# Patient Record
Sex: MALE | Race: WHITE | NOT HISPANIC OR LATINO | Employment: OTHER | URBAN - METROPOLITAN AREA
[De-identification: names, ages, dates, MRNs, and addresses within clinical notes are randomized per-mention and may not be internally consistent; named-entity substitution may affect disease eponyms.]

---

## 2024-12-30 NOTE — PROGRESS NOTES
Mercedes Ellis MD   Adult Reconstruction and Joint Replacement Surgery  Phone: [phone]     Fax: [phone]       Name: Dionisio Smith  Age: 79 y.o.   : 1945   Date of Visit: 2025        INITIAL CONSULTATION    CC: Right knee pain    Clinical History:  This patient presents with {Numbers; 1-10:23497} {weeks, months, years:86656} of {LEFT RIGHT BILATERAL:38314} knee pain.     Patient has tried the following {treatments tried:03999}. Date of last steroid injection: ***. Patient {DOES/DOES NOT:19523} have pain at night. Patient {DOES/DOES NOT:70142} report falls related to this problem. Patient is able to walk {Blocks: 56399}. Patient is currently using {assistive device:73723} as assistive device. Primarily complains of {pain location:99227} pain. Patient has difficulty with {activity limitations:64385}. The pain is significantly impacting their ability to perform activities of daily living. Patient reports no longer able to do activities such as ***.     Focused History  PMH: {FOCUSEDHX:10124}  PSH: {FOCUSEDSHX:83859}  Meds: {MEDSHX:52001}  Allergies: {MEDSHX:23669}  FH: ***No family history of any bleeding or clotting disorders.  SHx: {SOCHX:97445}  Dental Hx: {DENTAL:48296}  Jehovah´s Witness: no***    PROMs/HISTORY   ***    Review of Systems: Review of systems completed with medical assistant intake. Please refer to this note.     Physical Exam:  BMI: ***.    General: The patient is well appearing and has an appropriate affect.     Neurological Examination: ***SILT in SPN/DPN/Sural/Saphenous/Tibial nerves. ***5/5 EHL, FHL, Tibial anterior, Gastrocnemius. ***Coordination grossly intact.     Cardiovascular Exam: ***Capillary refill <2 seconds. ***2+ DP. ***No edema. ***No varicose veins.     Lymphatic Examination: There is no obvious lymphatic swelling*** present around the involved joint.    Skin Exam: Skin around the pertinent joint is without evidence of infection or ***rash.    Gait:  "The patient ambulates with an antalgic*** gait.     Lumbar spine:    No tenderness to palpation midline.    ***Negative straight leg raise bilaterally.    Right Hip Examination:  The skin is intact over the hip.    There is no tenderness over the greater trochanter.    Range of motion is full extension to 100 degrees of flexion.    The hip is stable without subluxation or dislocation.    The hip internally rotates to 15 degrees and externally rotates to 45 degrees.    There is no pain with hip motion.    Left Hip Examination:  The skin is intact over the hip.    There is no tenderness over the greater trochanter.    Range of motion is full extension to 100 degrees of flexion.    The hip is stable without subluxation or dislocation.    The hip internally rotates to 15 degrees and externally rotates to 45 degrees.    There is no pain with hip motion.    Right Knee Examination:  Examination of the knee reveals the skin to be intact.    There is {:97956::\"no\",\"a small\",\"a large\",\"a moderate\"} effusion in the knee.    The alignment of the knee is {:11739::\"Valgus\",\"neutral\",\"Varus\"}.    This deformity {:22802::\"is not\",\"is\"} correctable.    There is tenderness to palpation over the joint line.    There is significant quadriceps atrophy.    Range of Motion: {rom3:89080:: \"20\",\"15\",\"10\",\"5\",\"full extension\"} to {rom4:37657:: \"less than 90\",\"90\",\"100\",\"110\",\"120\"} degrees of flexion.    The knee is stable to varus-valgus stress and anterior-posterior stress.     There is {no/mild/moderate/severe:19664} grinding with range of motion.    There is {no/mild/moderate/severe:19664} patellofemoral crepitus.    Left Knee Examination:  Examination of the knee reveals the skin to be intact.     There is no obvious swelling.    There is a no effusion in the knee.     The alignment of the knee is normal.    There is no tenderness to palpation over the joint line.    There is no significant quadriceps atrophy.    Range of motion is full " extension to 120 degrees of flexion.    The knee is stable to varus-valgus stress and anterior-posterior stress.     There is {no/mild/moderate/severe:19664} grinding with range of motion.    There is {no/mild/moderate/severe:19664} patellofemoral crepitus.    Prior Labs:   ***    Radiographs:  Radiographs were personally reviewed today. There is evidence of {mild, moderate, severe: 55574} {LEFT RIGHT BILATERAL: 02556} knee osteoarthritis {with or without:86148} {medial, lateral, patellofemoral, tri:25471} bone on bone apposition.    Impression:  This patient presents with {AMB MILD/MODERATE/SEVERE:90885} {LEFT RIGHT BILATERAL:37538} knee osteoarthritis {with or without:92974} bone on bone apposition. ***Patient has tried and failed appropriate conservative measures and now has limitation in ADL's. ***The patient is not a candidate for surgery at this time.    Diagnosis:  ***    Recommendations / Plan:    I have discussed the options in detail with the patient. We have discussed anti-inflammatory medication, activity modification, physical therapy, corticosteroid injections, viscosupplementation injections, ***partial knee replacement surgery and total knee replacement surgery. ***The patient has not yet exhausted all conservative treatment measures.    The risks and benefits of all these treatment options have been discussed in detail. The patient has tried at least 3 months of the above conservative treatments and continues to have disabling pain, impaired activities of daily living and worsened quality of life.*** The patient is a candidate for a {LEFT RIGHT BILATERAL:51060} {PARTIAL TOTAL:70361} knee replacement. We discussed expected rehabilitation, DVT prophylaxis, time points during recovery, likely functional outcomes and long-term issues with knee replacement procedures.    We have reviewed the typical recovery after surgery and have discussed the fact that full recovery can take up to 1 year. Discussed  that numbness around the incision site is common after the surgery and can last up to a year or permanently.     ***For valgus knees, specifically discussed the possibility of peroneal nerve palsy resulting from correction of valgus deformity in the setting of total knee replacement, which can manifest as numbness over the dorsum of the foot or weakness in dorsiflexion or even foot drop. These changes can sometimes be permanent.      ***The patient has predominantly patellofemoral arthritis.  Reviewed the indications for patellofemoral arthroplasty versus total knee replacement as well as anticipated outcomes and longevity of each.  Discussed that patients with predominantly patellofemoral arthritis can have very good results with knee replacement surgery, though sometimes the results in this situation can be less satisfactory when compared to total knee replacement done for predominantly medial compartment arthritis.  The patient verbalizes understanding of this.    ***The patient has pre-existing hardware around the joint. Discussed that some or all of these implants may need to be removed to accommodate a joint replacement.    We have reviewed the typical recovery after surgery and have discussed the fact that full recovery can take up to 1 year or even longer.     The patient does not have any of the following contraindications to arthroplasty including active infection of the knee joint, systemic bacteremia, active skin infection or an open wound at the surgical site, neuropathic arthritis or severe, rapidly progressive neurological disease.     Patient will consider proceeding with {LEFT RIGHT BILATERAL:68337} {PARTIAL TOTAL:30872} knee replacement. All questions were answered today. If the patient chooses to move forward with surgical scheduling, they will be enrolled in a preoperative arthroplasty teaching class and the pre-admission testing pathway. The patient was encouraged to contact their primary care  physician*** to discuss fitness for surgery.     Social support after surgery: {Social:13115}  Pain medication plan: {Pain Plan:13191}  Additional preoperative considerations: {Preop:40182}    Diagnosis: {Surg DX:08810}   Procedure: {Surg Procedure:22357}  Surgery Location: {Surg Location:60505}   Equipment Requests: {Equipment Requests Primary:98384}  Anesthesia: {Anes:99883}  Discharge: {DISCHARGE:93574}    ***The risks and benefits of all these treatment options have been discussed in detail.     ***The patient has tried at least 3 months of the above conservative treatments and continues to have disabling pain, impaired activities of daily living and worsened quality of life.  Reviewed the surgical optimization steps to optimize their chances for a successful joint replacement surgery.      ***Currently their BMI is ***.  Discussed that obesity is a risk factor for continued progression of osteoarthritis. Each pound of weight loss offloads their hip and knee joints by 3-6 pounds.  The most effective of these options is weight loss mainly through restricting caloric intake. ***They would need to lose significant weight before being scheduled for surgery. A referral to a nutritionist was offered***. A referral to bariatric surgery was offered***.     ***A physical therapy prescription was ordered for the patient.  ***Patient will continue their home exercise program. ***Strategies for pain management using over-the-counter anti-inflammatory medications reviewed.  ***The patient was prescribed ***for pain management. ***The patient elects for a steroid injection, which was provided according to procedure note below. ***The patient was fit for a brace today. Encouraged them to maintain range of motion and strength around the knee joints.  They will continue to implement these strategies in addressing their pain.       ***Patient has extensively been involved in physical therapy in the past.  Patient defers a knee  brace today.  Steroid injections were discussed and patient will defer.    ***Patient was advised to seek further refills for prescription anti-inflammatory medications (e.g. Celebrex, Meloxicam) from their primary care physician as careful monitoring of kidney function, heart function, blood pressure, and other health considerations is important while on these medications.     ***Recommend the patient continue optimizing nonsurgical treatment interventions as outlined above for management of their knee arthritis.  ***I would be happy to see them again at any point to discuss surgery if they are more optimized or to review progress with nonsurgical treatment of arthritis. The patient verbalizes understanding with the recommendations and treatment plan as outlined above and is in agreement.  Questions were addressed.    Large Joint Injection 20610: Knee  Consent given by: Patient  Timeout: Immediately prior to procedure the correct patient, procedure, and site was verified. Sterile gloves and semi-sterile technique were used.   Indications: Knee pain and joint swelling.   Location: {LEFT RIGHT BILATERAL:17177} knee  Needle size: 22 G  Approach: Lateral    Medications administered: please see medical assistant note for lot number and expiration date.   Subcutaneous   4 ml of 1% lidocaine     Deep   4 ml of 1% lidocaine   4 mL 0.5% marcaine   2 mL of 40 mg kenalog     Aspirate amount: ***  Aspirate Appearance: ***   ***Analysis: Fluid sent for laboratory analysis, including cell count, differential, culture, crystal, AFB    Patient tolerance: Dressing applied. Patient tolerated the procedure well with no immediate complications.    _____________  Mercedes Ellis MD   Attending Orthopaedic Surgeon  [facility]    [facility]       This office note was transcribed with dictation software.  Please excuse any typographical errors, program misunderstandings leading to  inadvertent insertions or deletions of inappropriate wording, pronoun errors and other unintentional transcription errors not noticed on proof-reading.

## 2025-01-02 ENCOUNTER — HOSPITAL ENCOUNTER (OUTPATIENT)
Dept: RADIOLOGY | Facility: CLINIC | Age: 80
End: 2025-01-02
Payer: MEDICARE

## 2025-01-02 ENCOUNTER — APPOINTMENT (OUTPATIENT)
Dept: ORTHOPEDIC SURGERY | Facility: CLINIC | Age: 80
End: 2025-01-02
Payer: MEDICARE

## 2025-01-02 DIAGNOSIS — M17.12 PRIMARY OSTEOARTHRITIS OF LEFT KNEE: ICD-10-CM

## 2025-01-02 DIAGNOSIS — M17.11 PRIMARY OSTEOARTHRITIS OF RIGHT KNEE: Primary | ICD-10-CM

## 2025-01-31 ENCOUNTER — APPOINTMENT (OUTPATIENT)
Dept: ORTHOPEDIC SURGERY | Facility: CLINIC | Age: 80
End: 2025-01-31
Payer: MEDICARE

## 2025-01-31 ENCOUNTER — HOSPITAL ENCOUNTER (OUTPATIENT)
Dept: RADIOLOGY | Facility: CLINIC | Age: 80
Discharge: HOME | End: 2025-01-31
Payer: MEDICARE

## 2025-01-31 VITALS — WEIGHT: 209.4 LBS | HEIGHT: 70 IN | BODY MASS INDEX: 29.98 KG/M2

## 2025-01-31 DIAGNOSIS — M17.0 ARTHRITIS OF BOTH KNEES: Primary | ICD-10-CM

## 2025-01-31 DIAGNOSIS — M17.0 ARTHRITIS OF BOTH KNEES: ICD-10-CM

## 2025-01-31 PROCEDURE — 99204 OFFICE O/P NEW MOD 45 MIN: CPT | Performed by: STUDENT IN AN ORGANIZED HEALTH CARE EDUCATION/TRAINING PROGRAM

## 2025-01-31 PROCEDURE — 73564 X-RAY EXAM KNEE 4 OR MORE: CPT | Mod: 50

## 2025-01-31 RX ORDER — AMLODIPINE BESYLATE 10 MG/1
10 TABLET ORAL DAILY
COMMUNITY

## 2025-01-31 RX ORDER — ATORVASTATIN CALCIUM 40 MG/1
40 TABLET, FILM COATED ORAL
COMMUNITY
Start: 2024-03-07

## 2025-01-31 RX ORDER — AMLODIPINE BESYLATE 5 MG/1
5 TABLET ORAL DAILY
COMMUNITY
Start: 2017-04-10

## 2025-01-31 RX ORDER — ATORVASTATIN CALCIUM 10 MG/1
TABLET, FILM COATED ORAL
COMMUNITY
Start: 2017-01-27

## 2025-01-31 ASSESSMENT — PAIN - FUNCTIONAL ASSESSMENT: PAIN_FUNCTIONAL_ASSESSMENT: 0-10

## 2025-01-31 ASSESSMENT — PAIN SCALES - GENERAL: PAINLEVEL_OUTOF10: 5 - MODERATE PAIN

## 2025-01-31 ASSESSMENT — PAIN DESCRIPTION - DESCRIPTORS: DESCRIPTORS: SORE

## 2025-01-31 NOTE — PROGRESS NOTES
Mercedes Ellis MD   Adult Reconstruction and Joint Replacement Surgery  Phone: [phone]     Fax: [phone]       Name: Dionisio Smith  Age: 79 y.o.   : 1945   Date of Visit: 2025    INITIAL CONSULTATION    CC: bilateral knee pain    Clinical History:  This patient presents with several years of BILATERAL knee pain.     Patient has tried the following Activity modification, NSAIDs, Tylenol (arthritis dosing) , Physical therapy, Corticosteroid injections , and Xray. Date of last steroid injection: >6 months ago. Patient does not have pain at night. Patient does not report falls related to this problem. Patient is able to walk indoors only. Patient is currently using cane as assistive device. Primarily complains of anterior and medial pain. Patient has difficulty with climbing stairs, descending stairs, walking , getting up from a chair, prolonged sitting , and walking on unlevel surfaces . The pain is significantly impacting their ability to perform activities of daily living. Patient reports no longer able to do activities such as walking.     Focused History  PMH: Reviewed, PE/DVT: no, and Diabetes Mellitus  PSH: Reviewed , Hip/Knee replacement: no, Hip/Knee surgery: no, Anesthesia complications: no, Spine surgery: no, Surgical infection: no, and Weight loss surgery: no  Meds: Reviewed, Current Anticoagulants: no, Weight loss medication: no, and Current Opioids: no  Allergies: Reviewed  and The patient reports no contraindications or allergies to cephalosporins, aspirin, NSAIDs or opioids, except as noted above.  FH: No family history of any bleeding or clotting disorders.  SHx: Reviewed, Occupation: retired, Current smoker: no, EtOH intake weekly: rare, Social support: wife, and Preferred physical activities: Walking  Dental Hx: Last routine cleaning: > 6 months ago and All invasive dental work must be completed 3+ months prior to joint replacement surgery. Dental cleaning must be  completed 6+ weeks prior to joint replacement surgery. Patient are to avoid any invasive dental work 3-6 months post-surgically.   Jehovah´s Witness: no    PROMs/HISTORY   PROMs   No questionnaires on file.     History reviewed. No pertinent past medical history.    History reviewed. No pertinent past medical history.  Documented in chart and reviewed.     History reviewed. No pertinent surgical history.    Allergies: He is allergic to erythromycin.     Medications:  Current Outpatient Medications   Medication Instructions    amLODIPine (NORVASC) 10 mg, oral, Daily    amLODIPine (NORVASC) 5 mg, Daily    atorvastatin (Lipitor) 10 mg tablet Take by mouth.    atorvastatin (LIPITOR) 40 mg, Daily RT       No family history on file.  Documented in chart and reviewed.     Social History     Tobacco Use    Smoking status: Not on file    Smokeless tobacco: Not on file   Substance Use Topics    Alcohol use: Not on file        Review of Systems: Review of systems completed with medical assistant intake. Please refer to this note.     Physical Exam:  BMI: 30.    General: The patient is well appearing and has an appropriate affect.     Neurological Examination: SILT in SPN/DPN/Sural/Saphenous/Tibial nerves. 5/5 EHL, FHL, Tibial anterior, Gastrocnemius. Coordination grossly intact.     Cardiovascular Exam: Capillary refill <2 seconds.     Lymphatic Examination: There is no obvious lymphatic swelling present around the involved joint.    Skin Exam: Skin around the pertinent joint is without evidence of infection or rash.    Gait: The patient ambulates with an antalgic gait.     Lumbar spine:    No tenderness to palpation midline.    Negative straight leg raise bilaterally.    Right Hip Examination:  The skin is intact over the hip.    There is no tenderness over the greater trochanter.    Range of motion is full extension to 100 degrees of flexion.    The hip is stable without subluxation or dislocation.    The hip internally  "rotates to 15 degrees and externally rotates to 45 degrees.    There is no pain with hip motion.    Left Hip Examination:  The skin is intact over the hip.    There is no tenderness over the greater trochanter.    Range of motion is full extension to 100 degrees of flexion.    The hip is stable without subluxation or dislocation.    The hip internally rotates to 15 degrees and externally rotates to 45 degrees.    There is no pain with hip motion.    Right Knee Examination:  Examination of the knee reveals the skin to be intact.    There is a moderate effusion in the knee.    The alignment of the knee is Varus.    This deformity is not correctable.    There is tenderness to palpation over the joint line.    There is significant quadriceps atrophy.    Range of Motion: 10 to 110 degrees of flexion.    The knee is stable to varus-valgus stress and anterior-posterior stress.     There is moderate grinding with range of motion.    There is mild patellofemoral crepitus.    Left Knee Examination:  Examination of the knee reveals the skin to be intact.    There is a moderate effusion in the knee.    The alignment of the knee is Varus.    This deformity is not correctable.    There is tenderness to palpation over the joint line.    There is significant quadriceps atrophy.    Range of Motion: 10 to 110 degrees of flexion.    The knee is stable to varus-valgus stress and anterior-posterior stress.     There is moderate grinding with range of motion.    There is mild patellofemoral crepitus.    Prior Labs:   Prior Labs:   No results found for: \"WBC\", \"HGB\", \"HCT\", \"MCV\", \"PLT\"   No results found for: \"INR\", \"PROTIME\"      No results found for: \"GLUCOSE\", \"CALCIUM\", \"NA\", \"K\", \"CO2\", \"CL\", \"BUN\", \"CREATININE\"   No results found for: \"CKTOTAL\", \"CKMB\", \"CKMBINDEX\", \"TROPONINI\"   Lab Results   Component Value Date    HGBA1C 6.9 (H) 09/06/2024         No results found for: \"CRP\"   No results found for: \"SEDRATE\"  "     Radiographs:  Radiographs were personally reviewed today. There is evidence of severe BILATERAL knee osteoarthritis with MEDIAL bone on bone apposition.    Impression:  This patient presents with severe BILATERAL knee osteoarthritis with bone on bone apposition.     Diagnosis:  Arthritis of both knees     Recommendations / Plan:    I have discussed the options in detail with the patient. We have discussed anti-inflammatory medication, activity modification, physical therapy, corticosteroid injections, viscosupplementation injections, partial knee replacement surgery and total knee replacement surgery. The patient has not yet exhausted all conservative treatment measures.    The risks and benefits of all these treatment options have been discussed in detail.     The patient has tried at least 3 months of the above conservative treatments and continues to have disabling pain, impaired activities of daily living and worsened quality of life.  Reviewed the surgical optimization steps to optimize their chances for a successful joint replacement surgery.      Currently their BMI is 30.  Discussed that obesity is a risk factor for continued progression of osteoarthritis. Each pound of weight loss offloads their hip and knee joints by 3-6 pounds.  The most effective of these options is weight loss mainly through restricting caloric intake.     A physical therapy prescription was ordered for the patient.  Patient will continue their home exercise program. Strategies for pain management using over-the-counter anti-inflammatory medications reviewed.  Specifically discussed Ibuprofen up to 800 mg every 8 hours, with close monitoring for potential side effects from this medication.  Discussed utility of steroid injections and patient will consider this with future.  The patient was fit for a brace today. Econ brace. Encouraged them to maintain range of motion and strength around the knee joints.  They will continue to  implement these strategies in addressing their pain.       Recommend the patient continue optimizing nonsurgical treatment interventions as outlined above for management of their knee arthritis.  I would be happy to see them again at any point to discuss surgery if they are more optimized or to review progress with nonsurgical treatment of arthritis. The patient verbalizes understanding with the recommendations and treatment plan as outlined above and is in agreement.  Questions were addressed.    _____________  Mercedes Ellis MD   Attending Orthopaedic Surgeon  [facility]    [facility]       This office note was transcribed with dictation software.  Please excuse any typographical errors, program misunderstandings leading to inadvertent insertions or deletions of inappropriate wording, pronoun errors and other unintentional transcription errors not noticed on proof-reading.

## 2025-02-24 ENCOUNTER — EVALUATION (OUTPATIENT)
Dept: PHYSICAL THERAPY | Facility: CLINIC | Age: 80
End: 2025-02-24
Payer: MEDICARE

## 2025-02-24 DIAGNOSIS — M17.0 ARTHRITIS OF BOTH KNEES: ICD-10-CM

## 2025-02-24 PROCEDURE — 97161 PT EVAL LOW COMPLEX 20 MIN: CPT | Mod: GP

## 2025-02-24 NOTE — PROGRESS NOTES
"  Physical Therapy    Physical Therapy Evaluation and Treatment    Patient Name: Dionisio Smith  MRN: [id number]  Today's Date: 2/24/2025  Time Calculation  Start Time: 0945  Stop Time: 1030  Time Calculation (min): 45 min    Insurance:  Visit number: 1  Authorization info: [place] AUTH, [place]  Insurance Type: Payor: MEDICARE / Plan: MEDICARE PART A AND B / Product Type: *No Product type* /     Current Problem  1. Arthritis of both knees  Referral to Physical Therapy    Follow Up In Physical Therapy          General  79 yoM presenting to therapy for bilateral knee pain associated with OA  \"Bill\"     Precautions  LE neuropathy    SUBJECTIVE:   79 yoM presenting to therapy for bilateral knee pain associated with OA  Left knee worse than Right knee   Pain is located on the inside of his knees  Sore and stiff; not so much a pain  Ibuprofen PRN; helpful  Topical cream; somewhat helpful  Prior injections, not helpful  Aggravating factors: walking long distances, stairs, sit/stand transfers  Amb with cane at time  Denies falls, but states he's wobbly  Retired; wife still works   Bed/bath first floor  Stairs to basement, but uses chair lift  Going [facility] in May; plans to rent a scooter because he will be unable to walk it  [facility]  Pt trying to lose weight to help reduce his knee pain and improve his pain tolerance    Imaging:   Bilateral Knee Xray 01/2025  IMPRESSION:  Tricompartmental bilateral knee osteoarthrosis, severe medially.  Moderate bilateral suprapatellar joint effusions.     Pain:   Current: 0/10 no pain, mostly stiffness and soreness    Patient/Family Goal: less pain    Outcome Measures: 23/80    OBJECTIVE:    Observation: bilateral genu varus L > R    Transfers: Definite need of UE for supine to sit and sit to stand    Bilateral Hip AROM:  Flex: WFL B  ER at 90: WFL B  IR at 90: 50% impaired B  Ext: unable to achieve neutral B    Bilateral Knee AROM:  Ext: Lacking 8-10 deg B  Flex: 115 deg " "B  Patellofemoral Joint Mobility: hypomobile all planes B    Gait: antalgic without device, WBOS, increased movement in frontal plane \"waddling gait\", decreased single leg stance    Stairs: able to negotiate 4\" step with 2 rails     4 Stage Balance Test:  - NBOS: 10 sec  - Modified Tandem: 10 sec R/L  - Tandem: 5 sec R/L  - Single Le sec R/L    OP Education:   Okay to ride recumbent bike   Ice and elevation to manage swelling   Heat for stiffness (if not swelling)     HEP:  Access Code: [email or web address]  URL: [email or web address]  Date: 2025  Prepared by: Clarice Ruffing    Exercises  - Beginner Bridge  - 1-2 x daily - 7 x weekly - 1-3 sets - 10 reps  - Supine Quad Set  - 1-2 x daily - 7 x weekly - 1-3 sets - 10 reps - 3 hold  - Supine Ankle Pumps  - 1-2 x daily - 7 x weekly - 1-3 sets - 10 reps - 5-10 hold  - Supine Heel Slide with Strap  - 1-2 x daily - 7 x weekly - 1-3 sets - 10 reps - 5-10 hold  - Supine Piriformis Stretch with Leg Straight  - 1-2 x daily - 7 x weekly - 1-3 sets - 10 reps - 5-10 hold  - Heel Toe Raises with Counter Support  - 1-2 x daily - 7 x weekly - 1-3 sets - 10 reps    Response to treatment: Good    ASSESSMENT:   79 yoM presenting to therapy for bilateral knee pain associated with OA limiting his ability to walk, stand, perform transfers and negotiate stairs. Pt is a great candidate for PT. Treatment should focus on manual therapy to improve joint mobility and soft tissue mobility, exercises to hip improve LE flexibility and strength, and functional mobility exercises. Pt considering getting an injection so plans to contact ortho sometime soon.      PLAN:  OP PT PLAN:  Treatment/Interventions: Cryotherapy , Dry Needling  , Education/Instruction , Electrical Stimulation , Manual Therapy  , Neuromuscular re-education , Self care/home management , Taping techniques , Therapeutic activities , Therapeutic exercise  , and Ultrasound   PT Plan: Skilled PT   PT Frequency: 1-2 times per " week  Duration:10  Certification Period Start Date: 2/24/25  Certification Period End Date: 5/25/25  Visits Approved: [place]  Rehab Potential: Good  Plan of Care Agreement: Patient         Goals:   Bilateral knee AROM will improve to < 5 deg ext to >120 deg flex to improve joint mobility and reduce joint stiffness and soreness  2.   Pt will increase walking tolerance from 5 min to > 10 minutes to improve functional independence  3.   Pt will report an improvement in stiffness and soreness by 50% to allow for improved pain tolerance to all ADL's and functional mobility  4.   Pt will be independent with HEP to allow for exercise progression and eventual discharge to [facility]

## 2025-03-05 PROBLEM — M17.0 OSTEOARTHRITIS OF KNEES, BILATERAL: Status: ACTIVE | Noted: 2025-03-05

## 2025-03-05 NOTE — PROGRESS NOTES
"  Physical Therapy    Physical Therapy Evaluation and Treatment    Patient Name: Dionisio Smith  MRN: [id number]  Today's Date: 3/6/2025  Time Calculation  Start Time: 0915  Stop Time: 1000  Time Calculation (min): 45 min    Insurance:  Visit number: 2  Authorization info: [place] AUTH, [place]  Insurance Type: Payor: MEDICARE / Plan: MEDICARE PART A AND B / Product Type: *No Product type* /     Current Problem  1. Osteoarthritis of knees, bilateral        2. Arthritis of both knees  Follow Up In Physical Therapy            General  79 yoM presenting to therapy for bilateral knee pain associated with OA  \"Bill\"     Precautions  LE neuropathy    SUBJECTIVE:   Patient reports that he gets stiffness and can't walk too long.    Imaging:   Bilateral Knee Xray 2025  IMPRESSION:  Tricompartmental bilateral knee osteoarthrosis, severe medially.  Moderate bilateral suprapatellar joint effusions.     Pain:   Current: 0/10 no pain, mostly stiffness and soreness    Patient/Family Goal: less pain    Outcome Measures:     OBJECTIVE:    Observation: bilateral genu varus L > R    Transfers: Definite need of UE for supine to sit and sit to stand    Bilateral Hip AROM:  Flex: WFL B  ER at 90: WFL B  IR at 90: 50% impaired B  Ext: unable to achieve neutral B    Bilateral Knee AROM:  Ext: Lacking 8-10 deg B  Flex: 115 deg B  Patellofemoral Joint Mobility: hypomobile all planes B    Gait: antalgic without device, WBOS, increased movement in frontal plane \"waddling gait\", decreased single leg stance    Stairs: able to negotiate 4\" step with 2 rails     4 Stage Balance Test:  - NBOS: 10 sec  - Modified Tandem: 10 sec R/L  - Tandem: 5 sec R/L  - Single Le sec R/L    OP Education:   Okay to ride recumbent bike   Ice and elevation to manage swelling   Heat for stiffness (if not swelling)     HEP:  Access Code: [email or web address]  URL: [email or web address]  Date: 2025  Prepared by: Clarice Jose    [email or web address] Yolanda's Access code " "(3/6/25)  Ther EX:    Rec Bike x 6 min [place]/L2  Heelslides x 20 R/L  QS 5\" x 20   Hip add x 20   Hip abd x 20  LAQ 2#  2 x 10         Response to treatment: Good    ASSESSMENT:   Patient tolerated NWB exercises well with  no increased knee pain.  Will benefit from continued ROM, flexibility and strength training to maintain functional mobility with decreased pain.      PLAN:  OP PT PLAN:  Treatment/Interventions: Cryotherapy , Dry Needling  , Education/Instruction , Electrical Stimulation , Manual Therapy  , Neuromuscular re-education , Self care/home management , Taping techniques , Therapeutic activities , Therapeutic exercise  , and Ultrasound   PT Plan: Skilled PT   PT Frequency: 1-2 times per week  Duration:10  Certification Period Start Date: 2/24/25  Certification Period End Date: 5/25/25  Visits Approved: [place]  Rehab Potential: Good  Plan of Care Agreement: Patient         Goals:   Bilateral knee AROM will improve to < 5 deg ext to >120 deg flex to improve joint mobility and reduce joint stiffness and soreness  2.   Pt will increase walking tolerance from 5 min to > 10 minutes to improve functional independence  3.   Pt will report an improvement in stiffness and soreness by 50% to allow for improved pain tolerance to all ADL's and functional mobility  4.   Pt will be independent with [facility] to allow for exercise progression and eventual discharge to [facility]    "

## 2025-03-06 ENCOUNTER — TREATMENT (OUTPATIENT)
Dept: PHYSICAL THERAPY | Facility: CLINIC | Age: 80
End: 2025-03-06
Payer: MEDICARE

## 2025-03-06 DIAGNOSIS — M17.0 OSTEOARTHRITIS OF KNEES, BILATERAL: Primary | ICD-10-CM

## 2025-03-06 DIAGNOSIS — M17.0 ARTHRITIS OF BOTH KNEES: ICD-10-CM

## 2025-03-06 PROCEDURE — 97110 THERAPEUTIC EXERCISES: CPT | Mod: GP,CQ

## 2025-03-12 ENCOUNTER — TREATMENT (OUTPATIENT)
Dept: PHYSICAL THERAPY | Facility: CLINIC | Age: 80
End: 2025-03-12
Payer: MEDICARE

## 2025-03-12 DIAGNOSIS — M17.0 ARTHRITIS OF BOTH KNEES: ICD-10-CM

## 2025-03-12 PROCEDURE — 97110 THERAPEUTIC EXERCISES: CPT | Mod: GP

## 2025-03-12 PROCEDURE — 97140 MANUAL THERAPY 1/> REGIONS: CPT | Mod: GP

## 2025-03-12 NOTE — PROGRESS NOTES
"  Physical Therapy    Physical Therapy Evaluation and Treatment    Patient Name: Dionisio Smith  MRN: [id number]  Today's Date: 3/12/2025  Time Calculation  Start Time: 1115  Stop Time: 1200  Time Calculation (min): 45 min    Insurance:  Visit number: 3  Authorization info: [place] AUTH, [place]  Insurance Type: Payor: MEDICARE / Plan: MEDICARE PART A AND B / Product Type: *No Product type* /     Current Problem  1. Arthritis of both knees  Follow Up In Physical Therapy            General  79 yoM presenting to therapy for bilateral knee pain associated with OA  \"Bill\"     Precautions  LE neuropathy    SUBJECTIVE:   Felt okay after last session. Pain and stiffness near the inside of his knees.    Imaging:   Bilateral Knee Xray 2025  IMPRESSION:  Tricompartmental bilateral knee osteoarthrosis, severe medially.  Moderate bilateral suprapatellar joint effusions.     Pain:   Current: 0/10 no pain, mostly stiffness and soreness    Patient/Family Goal: less pain    Outcome Measures:     OBJECTIVE:    Observation: bilateral genu varus L > R    Transfers: Definite need of UE for supine to sit and sit to stand    Bilateral Hip AROM:  Flex: WFL B  ER at 90: WFL B  IR at 90: 50% impaired B  Ext: unable to achieve neutral B    Bilateral Knee AROM:  Ext: Lacking 8-10 deg B  Flex: 115 deg B  Patellofemoral Joint Mobility: hypomobile all planes B    Gait: antalgic without device, WBOS, increased movement in frontal plane \"waddling gait\", decreased single leg stance    Stairs: able to negotiate 4\" step with 2 rails     4 Stage Balance Test:  - NBOS: 10 sec  - Modified Tandem: 10 sec R/L  - Tandem: 5 sec R/L  - Single Le sec R/L    OP Education:   Okay to ride recumbent bike   Ice and elevation to manage swelling   Heat for stiffness (if not swelling)     HEP:  Access Code: [email or web address]  URL: [email or web address]  Date: 2025  Prepared by: Clarice Jose    [email or web address] Yolanda's Access code (3/6/25)    Ther EX: 20  Rec Bike x 5 " "min Level 1  Heelslides with strap x 15 R/L  QS with pillow support 3\" x 20   SAQ x 10 R/L      Manual 25  PROM ankle DF (for gastroc/soleus stretching)  Patellar mobilizations, all planes  STM medial knees  PROM hip flex + IR    Response to treatment: Felt better    ASSESSMENT:   Reproduction of medial knee pain with most exercises. Difficulty isolating quadriceps contractions secondary to limited knee ext. Good response to manual therapy with reduction in symptoms afterwards  Will benefit from continued ROM, flexibility and strength training to maintain functional mobility with decreased pain.      PLAN:  OP PT PLAN:  Treatment/Interventions: Cryotherapy , Dry Needling  , Education/Instruction , Electrical Stimulation , Manual Therapy  , Neuromuscular re-education , Self care/home management , Taping techniques , Therapeutic activities , Therapeutic exercise  , and Ultrasound   PT Plan: Skilled PT   PT Frequency: 1-2 times per week  Duration:10  Certification Period Start Date: 2/24/25  Certification Period End Date: 5/25/25  Visits Approved: [place]  Rehab Potential: Good  Plan of Care Agreement: Patient         Goals:   Bilateral knee AROM will improve to < 5 deg ext to >120 deg flex to improve joint mobility and reduce joint stiffness and soreness  2.   Pt will increase walking tolerance from 5 min to > 10 minutes to improve functional independence  3.   Pt will report an improvement in stiffness and soreness by 50% to allow for improved pain tolerance to all ADL's and functional mobility  4.   Pt will be independent with HEP to allow for exercise progression and eventual discharge to [facility]    "

## 2025-03-28 ENCOUNTER — APPOINTMENT (OUTPATIENT)
Dept: ORTHOPEDIC SURGERY | Facility: CLINIC | Age: 80
End: 2025-03-28
Payer: MEDICARE

## 2025-03-28 DIAGNOSIS — M17.0 ARTHRITIS OF BOTH KNEES: Primary | ICD-10-CM

## 2025-03-28 RX ORDER — LIDOCAINE HYDROCHLORIDE 10 MG/ML
8 INJECTION, SOLUTION INFILTRATION; PERINEURAL
Status: COMPLETED | OUTPATIENT
Start: 2025-03-28 | End: 2025-03-28

## 2025-03-28 RX ORDER — TRIAMCINOLONE ACETONIDE 40 MG/ML
40 INJECTION, SUSPENSION INTRA-ARTICULAR; INTRAMUSCULAR
Status: COMPLETED | OUTPATIENT
Start: 2025-03-28 | End: 2025-03-28

## 2025-03-28 RX ORDER — BUPIVACAINE HYDROCHLORIDE 5 MG/ML
4 INJECTION, SOLUTION PERINEURAL
Status: COMPLETED | OUTPATIENT
Start: 2025-03-28 | End: 2025-03-28

## 2025-03-28 RX ADMIN — LIDOCAINE HYDROCHLORIDE 8 ML: 10 INJECTION, SOLUTION INFILTRATION; PERINEURAL at 13:50

## 2025-03-28 RX ADMIN — TRIAMCINOLONE ACETONIDE 40 MG: 40 INJECTION, SUSPENSION INTRA-ARTICULAR; INTRAMUSCULAR at 13:50

## 2025-03-28 RX ADMIN — BUPIVACAINE HYDROCHLORIDE 4 ML: 5 INJECTION, SOLUTION PERINEURAL at 13:50

## 2025-03-28 NOTE — PROGRESS NOTES
Mercedes Ellis MD   Adult Reconstruction and Joint Replacement Surgery  Phone: [phone]     Fax: [phone]       Name: Dionisio Smith  Age: 79 y.o.   : 1945   Date of Visit: 3/28/2025    Follow-up Knee    CC: Follow-up BILATERAL knee     History of Present Illness:  This patient presents with several years of BILATERAL knee pain.     They were last seen for this problem on 2025. At the last visit, the patient was given a prescription for physical therapy.  He was encouraged to take ibuprofen for pain management.  Patient decided to defer steroid injections at the last visit.  He was fit for an Econ brace. The patient reports he would like to try injection to the left knee today.    Patient has tried the following Activity modification, NSAIDs, Tylenol (arthritis dosing) , Physical therapy, Corticosteroid injections , and Xray. Date of last steroid injection: >6 months ago. Patient does not have pain at night. Patient does not report falls related to this problem. Patient is able to walk indoors only. Patient is currently using cane as assistive device. Primarily complains of anterior and medial pain. Patient has difficulty with climbing stairs, descending stairs, walking , getting up from a chair, prolonged sitting , and walking on unlevel surfaces . The pain is significantly impacting their ability to perform activities of daily living. Patient reports no longer able to do activities such as walking.      Focused History  PMH: Reviewed, PE/DVT: no, and Diabetes Mellitus  PSH: Reviewed , Hip/Knee replacement: no, Hip/Knee surgery: no, Anesthesia complications: no, Spine surgery: no, Surgical infection: no, and Weight loss surgery: no  Meds: Reviewed, Current Anticoagulants: no, Weight loss medication: no, and Current Opioids: no  Allergies: Reviewed  and The patient reports no contraindications or allergies to cephalosporins, aspirin, NSAIDs or opioids, except as noted above.  FH: No  family history of any bleeding or clotting disorders.  SHx: Reviewed, Occupation: retired, Current smoker: no, EtOH intake weekly: rare, Social support: wife, and Preferred physical activities: Walking  Dental Hx: Last routine cleaning: > 6 months ago and All invasive dental work must be completed 3+ months prior to joint replacement surgery. Dental cleaning must be completed 6+ weeks prior to joint replacement surgery. Patient are to avoid any invasive dental work 3-6 months post-surgically.   Jehovah´s Witness: no     HISTORY  PROMs   No questionnaires on file.     No past medical history on file.    No past medical history on file.  Documented in chart and reviewed.     No past surgical history on file.    Allergies: He is allergic to erythromycin.     Medications:  Current Outpatient Medications   Medication Instructions    amLODIPine (NORVASC) 10 mg, oral, Daily    amLODIPine (NORVASC) 5 mg, Daily    atorvastatin (Lipitor) 10 mg tablet Take by mouth.    atorvastatin (LIPITOR) 40 mg, Daily RT       No family history on file.  Documented in chart and reviewed.     Social History     Tobacco Use    Smoking status: Not on file    Smokeless tobacco: Not on file   Substance Use Topics    Alcohol use: Not on file        Review of Systems: Review of systems completed with medical assistant intake. Please refer to this note.     Physical Exam:  BMI: 30.    General: The patient is well appearing and has an appropriate affect.      Neurological Examination: SILT in SPN/DPN/Sural/Saphenous/Tibial nerves. 5/5 EHL, FHL, Tibial anterior, Gastrocnemius. Coordination grossly intact.      Cardiovascular Exam: Capillary refill <2 seconds.      Lymphatic Examination: There is no obvious lymphatic swelling present around the involved joint.     Skin Exam: Skin around the pertinent joint is without evidence of infection or rash.     Gait: The patient ambulates with an antalgic gait.      Lumbar spine:     No tenderness to palpation  midline.     Negative straight leg raise bilaterally.     Right Hip Examination:  The skin is intact over the hip.     There is no tenderness over the greater trochanter.     Range of motion is full extension to 100 degrees of flexion.     The hip is stable without subluxation or dislocation.     The hip internally rotates to 15 degrees and externally rotates to 45 degrees.     There is no pain with hip motion.     Left Hip Examination:  The skin is intact over the hip.     There is no tenderness over the greater trochanter.     Range of motion is full extension to 100 degrees of flexion.     The hip is stable without subluxation or dislocation.     The hip internally rotates to 15 degrees and externally rotates to 45 degrees.     There is no pain with hip motion.     Right Knee Examination:  Examination of the knee reveals the skin to be intact.     There is a moderate effusion in the knee.     The alignment of the knee is Varus.     This deformity is not correctable.     There is tenderness to palpation over the joint line.     There is significant quadriceps atrophy.     Range of Motion: 10 to 110 degrees of flexion.     The knee is stable to varus-valgus stress and anterior-posterior stress.      There is moderate grinding with range of motion.     There is mild patellofemoral crepitus.     Left Knee Examination:  Examination of the knee reveals the skin to be intact.     There is a moderate effusion in the knee.     The alignment of the knee is Varus.     This deformity is not correctable.     There is tenderness to palpation over the joint line.     There is significant quadriceps atrophy.     Range of Motion: 10 to 110 degrees of flexion.     The knee is stable to varus-valgus stress and anterior-posterior stress.      There is moderate grinding with range of motion.     There is mild patellofemoral crepitus.    Imaging:  Radiographs were personally reviewed today. There is evidence of severe BILATERAL knee  osteoarthritis with MEDIAL bone on bone apposition.    Impression and Plan:  This patient is here for follow-up evaluation of BILATERAL knee.    DIAGNOSIS  Arthritis of both knees     I have discussed the options in detail with the patient. We have discussed anti-inflammatory medication, activity modification, physical therapy, corticosteroid injections, viscosupplementation injections, partial knee replacement surgery and total knee replacement surgery. Patient is responding well to nonsurgical treatment measures.    The risks and benefits of all these treatment options have been discussed in detail.     The patient has tried at least 3 months of the above conservative treatments and continues to have disabling pain, impaired activities of daily living and worsened quality of life.  Reviewed the surgical optimization steps to optimize their chances for a successful joint replacement surgery.      Currently their BMI is 30.  Discussed that obesity is a risk factor for continued progression of osteoarthritis. Each pound of weight loss offloads their hip and knee joints by 3-6 pounds.  The most effective of these options is weight loss mainly through restricting caloric intake.     A physical therapy prescription was ordered for the patient previously.  Patient will continue their home exercise program. Strategies for pain management using over-the-counter anti-inflammatory medications reviewed.  The patient elects for a steroid injection, which was provided according to procedure note below. Discussed utility of brace.  Patient has previously been fit for Eicon brace. Encouraged them to maintain range of motion and strength around the knee joints.  They will continue to implement these strategies in addressing their pain.      A1c has risen. Will only plan to inject LEFT knee today given poorly managed diabetes.     Recommend the patient continue optimizing nonsurgical treatment interventions as outlined above for  management of their knee arthritis.  I would be happy to see them again at any point to discuss surgery if indicated or they are more optimized or to review progress with nonsurgical treatment of arthritis. The patient verbalizes understanding with the recommendations and treatment plan as outlined above and is in agreement.  Questions were addressed.    RTC: as needed    X-rays at next visit: as needed    Large Joint Injection 20610: Knee  Consent given by: Patient  Timeout: Immediately prior to procedure the correct patient, procedure, and site was verified. Sterile gloves and semi-sterile technique were used.   Indications: Knee pain and joint swelling.   Location: LEFT knee  Needle size: 22 G  Approach: Lateral    Medications administered: Please refer to medical assistant note for lot number and expiration date.   Subcutaneous   4 ml of 1% lidocaine     Deep   4 ml of 1% lidocaine   4 mL 0.5% marcaine   1 mL of 40 mg kenalog     Patient tolerance: Dressing applied. Patient tolerated the procedure well with no immediate complications.    L Inj/Asp: L knee on 3/28/2025 1:50 PM  Indications: pain and joint swelling  Details: 22 G needle, lateral approach  Medications: 40 mg triamcinolone acetonide 40 mg/mL; 8 mL lidocaine 10 mg/mL (1 %); 4 mL BUPivacaine HCl 0.5 % (5 mg/mL)  Outcome: tolerated well, no immediate complications  Procedure, treatment alternatives, risks and benefits explained, specific risks discussed. Consent was given by the patient. Immediately prior to procedure a time out was called to verify the correct patient, procedure, equipment, support staff and site/side marked as required. Patient was prepped and draped in the usual sterile fashion.       _____________________  Mercedes Ellis MD   Attending Orthopaedic Surgeon  [facility]    [facility]    This office note was transcribed with dictation software.  Please excuse any typographical  errors, program misunderstandings leading to inadvertent insertions or deletions of inappropriate wording, pronoun errors and other unintentional transcription errors not noticed on proof-reading.

## 2025-04-01 ENCOUNTER — APPOINTMENT (OUTPATIENT)
Dept: PHYSICAL THERAPY | Facility: CLINIC | Age: 80
End: 2025-04-01
Payer: MEDICARE

## 2025-04-06 NOTE — PROGRESS NOTES
"  Physical Therapy    Physical Therapy Evaluation and Treatment    Patient Name: Dionisio Smith  MRN: [id number]  Today's Date: 2025  Time Calculation  Start Time: 1000  Stop Time: 1045  Time Calculation (min): 45 min    Insurance:  Visit number: 4  Authorization info: [place] AUTH, [place]  Insurance Type: Payor: MEDICARE / Plan: MEDICARE PART A AND B / Product Type: *No Product type* /     Current Problem  1. Osteoarthritis of knees, bilateral        2. Arthritis of both knees  Follow Up In Physical Therapy            General  79 yoM presenting to therapy for bilateral knee pain associated with OA  \"Bill\"     Precautions  LE neuropathy    SUBJECTIVE  Patient reports that he had an injection in L knee and will have one in R in May    Imaging:   Bilateral Knee Xray 2025  IMPRESSION:  Tricompartmental bilateral knee osteoarthrosis, severe medially.  Moderate bilateral suprapatellar joint effusions.     Pain:   Current: 0/10 no pain, mostly stiffness and soreness    Patient/Family Goal: less pain    Outcome Measures:     OBJECTIVE:    Observation: bilateral genu varus L > R    Transfers: Definite need of UE for supine to sit and sit to stand    Bilateral Hip AROM:  Flex: WFL B  ER at 90: WFL B  IR at 90: 50% impaired B  Ext: unable to achieve neutral B    Bilateral Knee AROM:  Ext: Lacking 8-10 deg B  Flex: 115 deg B  Patellofemoral Joint Mobility: hypomobile all planes B    Gait: antalgic without device, WBOS, increased movement in frontal plane \"waddling gait\", decreased single leg stance    Stairs: able to negotiate 4\" step with 2 rails     4 Stage Balance Test:  - NBOS: 10 sec  - Modified Tandem: 10 sec R/L  - Tandem: 5 sec R/L  - Single Le sec R/L    OP Education:   Okay to ride recumbent bike   Ice and elevation to manage swelling   Heat for stiffness (if not swelling)     HEP:  Access Code: [email or web address]  URL: [email or web address]  Date: 2025  Prepared by: Clarice Jose    [email or web address] Thomas " "Access code (3/6/25)    Ther EX: 15  Rec Bike x 5 min Level 1  Heelslides with strap x 15 R/L  QS with pillow support 3\" x 20   SAQ x 10 R/L      Manual 25  PROM ankle DF (for gastroc/soleus stretching)  Patellar mobilizations, all planes  STM medial knees  Gentle seated distraction    Response to treatment: Felt better    ASSESSMENT:    Good response to manual therapy with reduction in symptoms afterwards  Will benefit from continued ROM, flexibility and strength training to maintain functional mobility with decreased pain.      PLAN:  OP PT PLAN:  Treatment/Interventions: Cryotherapy , Dry Needling  , Education/Instruction , Electrical Stimulation , Manual Therapy  , Neuromuscular re-education , Self care/home management , Taping techniques , Therapeutic activities , Therapeutic exercise  , and Ultrasound   PT Plan: Skilled PT   PT Frequency: 1-2 times per week  Duration:10  Certification Period Start Date: 2/24/25  Certification Period End Date: 5/25/25  Visits Approved: [place]  Rehab Potential: Good  Plan of Care Agreement: Patient         Goals:   Bilateral knee AROM will improve to < 5 deg ext to >120 deg flex to improve joint mobility and reduce joint stiffness and soreness  2.   Pt will increase walking tolerance from 5 min to > 10 minutes to improve functional independence  3.   Pt will report an improvement in stiffness and soreness by 50% to allow for improved pain tolerance to all ADL's and functional mobility  4.   Pt will be independent with [facility] to allow for exercise progression and eventual discharge to [facility]    "

## 2025-04-07 ENCOUNTER — TREATMENT (OUTPATIENT)
Dept: PHYSICAL THERAPY | Facility: CLINIC | Age: 80
End: 2025-04-07
Payer: MEDICARE

## 2025-04-07 DIAGNOSIS — M17.0 OSTEOARTHRITIS OF KNEES, BILATERAL: Primary | ICD-10-CM

## 2025-04-07 DIAGNOSIS — M17.0 ARTHRITIS OF BOTH KNEES: ICD-10-CM

## 2025-04-07 PROCEDURE — 97110 THERAPEUTIC EXERCISES: CPT | Mod: GP,CQ

## 2025-04-07 PROCEDURE — 97140 MANUAL THERAPY 1/> REGIONS: CPT | Mod: GP,CQ

## 2025-04-29 NOTE — PROGRESS NOTES
"  Physical Therapy    Physical Therapy Evaluation and Treatment    Patient Name: Dionisio Smith  MRN: [id number]  Today's Date: 2025  Time Calculation  Start Time: 1000  Stop Time: 1045  Time Calculation (min): 45 min    Insurance:  Visit number: 5  Authorization info: [place] AUTH, [place]  Insurance Type: Payor: MEDICARE / Plan: MEDICARE PART A AND B / Product Type: *No Product type* /     Current Problem  1. Osteoarthritis of knees, bilateral        2. Arthritis of both knees  Follow Up In Physical Therapy            General  79 yoM presenting to therapy for bilateral knee pain associated with OA  \"Bill\"     Precautions  LE neuropathy    SUBJECTIVE  Patient reports that he is getting injection in R knee Friday.  I know that its not going to go completely away but the PT makes it feel a little better. Going to [place] next week.    Imaging:   Bilateral Knee Xray 2025  IMPRESSION:  Tricompartmental bilateral knee osteoarthrosis, severe medially.  Moderate bilateral suprapatellar joint effusions.     Pain:   Current: 0/10 no pain, mostly stiffness and soreness    Patient/Family Goal: less pain    Outcome Measures:     OBJECTIVE:    Observation: bilateral genu varus L > R    Transfers: Definite need of UE for supine to sit and sit to stand    Bilateral Hip AROM:  Flex: WFL B  ER at 90: WFL B  IR at 90: 50% impaired B  Ext: unable to achieve neutral B    Bilateral Knee AROM:  Ext: Lacking 8-10 deg B  Flex: 115 deg B  Patellofemoral Joint Mobility: hypomobile all planes B    Gait: antalgic without device, WBOS, increased movement in frontal plane \"waddling gait\", decreased single leg stance    Stairs: able to negotiate 4\" step with 2 rails     4 Stage Balance Test:  - NBOS: 10 sec  - Modified Tandem: 10 sec R/L  - Tandem: 5 sec R/L  - Single Le sec R/L    OP Education:   Okay to ride recumbent bike   Ice and elevation to manage swelling   Heat for stiffness (if not swelling)     HEP:  Access Code: [email or web address]  URL: " "[email or web address]  Date: 02/24/2025  Prepared by: Clarice Jose    [email or web address] Yolanda's Access code (3/6/25)    Ther EX: 15  Rec Bike x 5 min Level 1  Heelslides with strap x 15 R/L  QS with pillow support 3\" x 20   SAQ x 10 R/L      Manual 25  Patellar mobilizations, all planes  STM medial knees  PROM B knees    Response to treatment: Felt better    ASSESSMENT:   Good response to manual therapy with reduction in symptoms afterwards He has decreased discomfort with massage but will benefit from a more active program after return from [place].  Will benefit from continued ROM, flexibility and strength training to maintain functional mobility with decreased pain.      PLAN:  OP PT PLAN:  Treatment/Interventions: Cryotherapy , Dry Needling  , Education/Instruction , Electrical Stimulation , Manual Therapy  , Neuromuscular re-education , Self care/home management , Taping techniques , Therapeutic activities , Therapeutic exercise  , and Ultrasound   PT Plan: Skilled PT   PT Frequency: 1-2 times per week  Duration:10  Certification Period Start Date: 2/24/25  Certification Period End Date: 5/25/25  Visits Approved: [place]  Rehab Potential: Good  Plan of Care Agreement: Patient         Goals:   Bilateral knee AROM will improve to < 5 deg ext to >120 deg flex to improve joint mobility and reduce joint stiffness and soreness  2.   Pt will increase walking tolerance from 5 min to > 10 minutes to improve functional independence  3.   Pt will report an improvement in stiffness and soreness by 50% to allow for improved pain tolerance to all ADL's and functional mobility  4.   Pt will be independent with HEP to allow for exercise progression and eventual discharge to [facility]    "

## 2025-04-30 ENCOUNTER — TREATMENT (OUTPATIENT)
Dept: PHYSICAL THERAPY | Facility: CLINIC | Age: 80
End: 2025-04-30
Payer: MEDICARE

## 2025-04-30 DIAGNOSIS — M17.0 OSTEOARTHRITIS OF KNEES, BILATERAL: Primary | ICD-10-CM

## 2025-04-30 DIAGNOSIS — M17.0 ARTHRITIS OF BOTH KNEES: ICD-10-CM

## 2025-04-30 PROCEDURE — 97110 THERAPEUTIC EXERCISES: CPT | Mod: GP,CQ

## 2025-04-30 PROCEDURE — 97140 MANUAL THERAPY 1/> REGIONS: CPT | Mod: GP,CQ

## 2025-05-02 ENCOUNTER — APPOINTMENT (OUTPATIENT)
Dept: ORTHOPEDIC SURGERY | Facility: CLINIC | Age: 80
End: 2025-05-02
Payer: MEDICARE

## 2025-05-02 DIAGNOSIS — M17.11 PRIMARY OSTEOARTHRITIS OF RIGHT KNEE: Primary | ICD-10-CM

## 2025-05-02 PROCEDURE — 99214 OFFICE O/P EST MOD 30 MIN: CPT | Performed by: STUDENT IN AN ORGANIZED HEALTH CARE EDUCATION/TRAINING PROGRAM

## 2025-05-02 PROCEDURE — 20610 DRAIN/INJ JOINT/BURSA W/O US: CPT | Performed by: STUDENT IN AN ORGANIZED HEALTH CARE EDUCATION/TRAINING PROGRAM

## 2025-05-02 RX ORDER — BUPIVACAINE HYDROCHLORIDE 5 MG/ML
4 INJECTION, SOLUTION PERINEURAL
Status: COMPLETED | OUTPATIENT
Start: 2025-05-02 | End: 2025-05-02

## 2025-05-02 RX ORDER — LIDOCAINE HYDROCHLORIDE 10 MG/ML
8 INJECTION, SOLUTION INFILTRATION; PERINEURAL
Status: COMPLETED | OUTPATIENT
Start: 2025-05-02 | End: 2025-05-02

## 2025-05-02 RX ORDER — TRIAMCINOLONE ACETONIDE 40 MG/ML
40 INJECTION, SUSPENSION INTRA-ARTICULAR; INTRAMUSCULAR
Status: COMPLETED | OUTPATIENT
Start: 2025-05-02 | End: 2025-05-02

## 2025-05-02 RX ADMIN — TRIAMCINOLONE ACETONIDE 40 MG: 40 INJECTION, SUSPENSION INTRA-ARTICULAR; INTRAMUSCULAR at 11:01

## 2025-05-02 RX ADMIN — BUPIVACAINE HYDROCHLORIDE 4 ML: 5 INJECTION, SOLUTION PERINEURAL at 11:01

## 2025-05-02 RX ADMIN — LIDOCAINE HYDROCHLORIDE 8 ML: 10 INJECTION, SOLUTION INFILTRATION; PERINEURAL at 11:01

## 2025-05-02 NOTE — PROGRESS NOTES
Mercedes Ellis MD   Adult Reconstruction and Joint Replacement Surgery  Phone: [phone]     Fax: [phone]       Name: Dionisio Smith  Age: 79 y.o.   : 1945   Date of Visit: 2025    Follow-up Knee    CC: Follow-up RIGHT knee     History of Present Illness:  This patient presents with several years of BILATERAL knee pain.     They were last seen for this problem on 3/28/2025. At the last visit, the patient received a steroid injection to the LEFT knee.  He did not receive an injection to the RIGHT knee at that time given elevated A1c.  The patient reports 30% relief for the last 3 weeks after LEFT knee injection.  He returns today hoping for an RIGHT knee injection.    Patient has tried the following Activity modification, NSAIDs, Tylenol (arthritis dosing) , Physical therapy, Corticosteroid injections , and Xray. Date of last steroid injection: >6 months ago RIGHT knee, 3/28/2025 LEFT knee. Patient does not have pain at night. Patient does not report falls related to this problem. Patient is able to walk indoors only. Patient is currently using cane as assistive device. Primarily complains of anterior and medial pain. Patient has difficulty with climbing stairs, descending stairs, walking , getting up from a chair, prolonged sitting , and walking on unlevel surfaces . The pain is significantly impacting their ability to perform activities of daily living. Patient reports no longer able to do activities such as walking.      Focused History  PMH: Reviewed, PE/DVT: no, and Diabetes Mellitus  PSH: Reviewed , Hip/Knee replacement: no, Hip/Knee surgery: no, Anesthesia complications: no, Spine surgery: no, Surgical infection: no, and Weight loss surgery: no  Meds: Reviewed, Current Anticoagulants: no, Weight loss medication: no, and Current Opioids: no  Allergies: Reviewed  and The patient reports no contraindications or allergies to cephalosporins, aspirin, NSAIDs or opioids, except as noted  above.  FH: No family history of any bleeding or clotting disorders.  SHx: Reviewed, Occupation: retired, Current smoker: no, EtOH intake weekly: rare, Social support: wife, and Preferred physical activities: Walking  Dental Hx: Last routine cleaning: > 6 months ago and All invasive dental work must be completed 3+ months prior to joint replacement surgery. Dental cleaning must be completed 6+ weeks prior to joint replacement surgery. Patient are to avoid any invasive dental work 3-6 months post-surgically.   Jehovah´s Witness: no    HISTORY  PROMs   No questionnaires on file.     Medical History[1]    Medical History[2]  Documented in chart and reviewed.     Surgical History[3]    Allergies: He is allergic to erythromycin.     Medications:  Current Outpatient Medications   Medication Instructions    amLODIPine (NORVASC) 10 mg, oral, Daily    amLODIPine (NORVASC) 5 mg, Daily    atorvastatin (Lipitor) 10 mg tablet Take by mouth.    atorvastatin (LIPITOR) 40 mg, Daily RT       Family History[4]  Documented in chart and reviewed.     Social History     Tobacco Use    Smoking status: Not on file    Smokeless tobacco: Not on file   Substance Use Topics    Alcohol use: Not on file        Review of Systems: Review of systems completed with medical assistant intake. Please refer to this note.     Physical Exam:  BMI: 30.    General: The patient is well appearing and has an appropriate affect.      Neurological Examination: SILT in SPN/DPN/Sural/Saphenous/Tibial nerves. 5/5 EHL, FHL, Tibial anterior, Gastrocnemius. Coordination grossly intact.      Cardiovascular Exam: Capillary refill <2 seconds.      Lymphatic Examination: There is no obvious lymphatic swelling present around the involved joint.     Skin Exam: Skin around the pertinent joint is without evidence of infection or rash.     Gait: The patient ambulates with an antalgic gait.      Lumbar spine:     No tenderness to palpation midline.     Negative straight leg  raise bilaterally.     Right Hip Examination:  The skin is intact over the hip.     There is no tenderness over the greater trochanter.     Range of motion is full extension to 100 degrees of flexion.     The hip is stable without subluxation or dislocation.     The hip internally rotates to 15 degrees and externally rotates to 45 degrees.     There is no pain with hip motion.     Left Hip Examination:  The skin is intact over the hip.     There is no tenderness over the greater trochanter.     Range of motion is full extension to 100 degrees of flexion.     The hip is stable without subluxation or dislocation.     The hip internally rotates to 15 degrees and externally rotates to 45 degrees.     There is no pain with hip motion.     Right Knee Examination:  Examination of the knee reveals the skin to be intact.     There is a moderate effusion in the knee.     The alignment of the knee is Varus.     This deformity is not correctable.     There is tenderness to palpation over the joint line.     There is significant quadriceps atrophy.     Range of Motion: 10 to 110 degrees of flexion.     The knee is stable to varus-valgus stress and anterior-posterior stress.      There is moderate grinding with range of motion.     There is mild patellofemoral crepitus.     Left Knee Examination:  Examination of the knee reveals the skin to be intact.     There is a moderate effusion in the knee.     The alignment of the knee is Varus.     This deformity is not correctable.     There is tenderness to palpation over the joint line.     There is significant quadriceps atrophy.     Range of Motion: 10 to 110 degrees of flexion.     The knee is stable to varus-valgus stress and anterior-posterior stress.      There is moderate grinding with range of motion.     There is mild patellofemoral crepitus.    Imaging:    Radiographs were personally reviewed today. There is evidence of severe BILATERAL knee osteoarthritis with MEDIAL bone on  bone apposition.    Impression and Plan:  This patient is here for follow-up evaluation of BILATERAL knee.    DIAGNOSIS  Primary osteoarthritis of right knee     I have discussed the options in detail with the patient. We have discussed anti-inflammatory medication, activity modification, physical therapy, corticosteroid injections, viscosupplementation injections, partial knee replacement surgery and total knee replacement surgery. Patient is responding well to nonsurgical treatment measures.    The risks and benefits of all these treatment options have been discussed in detail.     The patient has tried at least 3 months of the above conservative treatments and continues to have disabling pain, impaired activities of daily living and worsened quality of life.  Reviewed the surgical optimization steps to optimize their chances for a successful joint replacement surgery.      Currently their BMI is 30.  Discussed that obesity is a risk factor for continued progression of osteoarthritis. Each pound of weight loss offloads their hip and knee joints by 3-6 pounds.  The most effective of these options is weight loss mainly through restricting caloric intake.     Encouraged continued physical therapy.  Patient will continue their home exercise program. Strategies for pain management using over-the-counter anti-inflammatory medications reviewed.  The patient elects for a steroid injection, which was provided according to procedure note below. Discussed utility of brace. Will defer brace at this time.  Already has brace for LEFT knee. Encouraged them to maintain range of motion and strength around the knee joints.  They will continue to implement these strategies in addressing their pain.      Recommend the patient continue optimizing nonsurgical treatment interventions as outlined above for management of their knee arthritis.  I would be happy to see them again at any point to discuss surgery if indicated or they are more  optimized or to review progress with nonsurgical treatment of arthritis. The patient verbalizes understanding with the recommendations and treatment plan as outlined above and is in agreement.  Questions were addressed.    RTC: as needed    X-rays at next visit: as needed    Large Joint Injection 01479: Knee  Consent given by: Patient  Timeout: Immediately prior to procedure the correct patient, procedure, and site was verified. Sterile gloves and semi-sterile technique were used.   Indications: Knee pain and joint swelling.   Location: RIGHT knee  Needle size: 22 G  Approach: Lateral    Medications administered: Please refer to medical assistant note for lot number and expiration date.   Subcutaneous   4 ml of 1% lidocaine     Deep   4 ml of 1% lidocaine   4 mL 0.5% marcaine   1 mL of 40 mg kenalog     Patient tolerance: Dressing applied. Patient tolerated the procedure well with no immediate complications.    L Inj/Asp: R knee on 5/2/2025 11:01 AM  Indications: pain and joint swelling  Details: 22 G needle, lateral approach  Medications: 40 mg triamcinolone acetonide 40 mg/mL; 8 mL lidocaine 10 mg/mL (1 %); 4 mL BUPivacaine HCl 0.5 % (5 mg/mL)  Outcome: tolerated well, no immediate complications  Procedure, treatment alternatives, risks and benefits explained, specific risks discussed. Consent was given by the patient. Immediately prior to procedure a time out was called to verify the correct patient, procedure, equipment, support staff and site/side marked as required. Patient was prepped and draped in the usual sterile fashion.             _____________________  Mercedes Ellis MD   Attending Orthopaedic Surgeon  [facility]    [facility]    This office note was transcribed with dictation software.  Please excuse any typographical errors, program misunderstandings leading to inadvertent insertions or deletions of inappropriate wording, pronoun errors and  other unintentional transcription errors not noticed on proof-reading.             [1] No past medical history on file.  [2] No past medical history on file.  [3] No past surgical history on file.  [4] No family history on file.

## 2025-05-23 ENCOUNTER — TREATMENT (OUTPATIENT)
Dept: PHYSICAL THERAPY | Facility: CLINIC | Age: 80
End: 2025-05-23
Payer: MEDICARE

## 2025-05-23 DIAGNOSIS — M17.0 ARTHRITIS OF BOTH KNEES: ICD-10-CM

## 2025-05-23 PROCEDURE — 97110 THERAPEUTIC EXERCISES: CPT | Mod: GP

## 2025-05-23 PROCEDURE — 97140 MANUAL THERAPY 1/> REGIONS: CPT | Mod: GP

## 2025-05-23 NOTE — PROGRESS NOTES
"  Physical Therapy    Physical Therapy Evaluation and Treatment    Patient Name: Dionisio Smith  MRN: [id number]  Today's Date: 5/23/2025  Time Calculation  Start Time: 1400  Stop Time: 1445  Time Calculation (min): 45 min    Insurance:  Visit number: 6  Authorization info: NO AUTH, [place]  Insurance Type: Payor: MEDICARE / Plan: MEDICARE PART A AND B / Product Type: *No Product type* /     Current Problem  1. Arthritis of both knees  Follow Up In Physical Therapy            General  79 yoM presenting to therapy for bilateral knee pain associated with OA  \"Bill\"     Precautions  LE neuropathy    SUBJECTIVE  Received a gel injection in his Right knee on 5/2/25. It's been helpful. Right knee feels better than Left knee in general. Recently went to [place] with his family. Used a scooter while he was there knowing he wouldn't be able to walk the luc[place]. Currently able to walk about 5 min, sometimes a little more or less. Primary goal is to improve his ability to walk. Partially compliant with HEP.    Imaging:   Bilateral Knee Xray 01/2025  IMPRESSION:  Tricompartmental bilateral knee osteoarthrosis, severe medially.  Moderate bilateral suprapatellar joint effusions.     Pain:   Current: 0/10 no pain, mostly stiffness and soreness    Patient/Family Goal: less pain    Outcome Measures: 23/80    OBJECTIVE:    Updated 5/23/35  Observation: bilateral genu varus L > R    Transfers: Sit to stand x 1 rep without UE but voices difficulty and stiffness    Bilateral Hip AROM:  Flex: WFL B  ER at 90: WFL B  IR at 90: 50% impaired B  Ext: unable to achieve neutral B    Bilateral Knee AROM:  Ext: Lacking 8-10 deg B  Flex: 120 deg R, 115 deg L  Patellofemoral Joint Mobility: hypomobile all planes B    Gait: antalgic without device, WBOS, increased movement in frontal plane \"waddling gait\", decreased single leg stance    Stairs: able to negotiate 4\" step with 2 rails     4 Stage Balance Test:  - NBOS: 10 sec  - Modified Tandem: 10 sec R/L  - " "Tandem: 10 sec R, 5 sec L  - Single Le sec R/L    OP Education:   Okay to ride recumbent bike   Ice and elevation to manage swelling   Heat for stiffness (if not swelling)     HEP:  Access Code: [email or web address]  URL: [email or web address]  Date: 2025  Prepared by: Clarice Jose    [email or web address] Yolanda's Access code (3/6/25)    Ther EX: 35  Rec Bike x 5 min Level 1  Recheck  Step ups, 4\" x 5 R/L   Calf stretch slantboard 10\" x 5   Mini squats -attempted, painful  TKE x 10 R/L   Sidestep x 10  SAQ x 10 R/L      Manual 10  Patellar mobilizations, all planes (not today)  STM medial knees  PROM B knees (not today)    Response to treatment: Felt better    ASSESSMENT:   Recheck performed today. Some improvement in Right knee ROM, strength and balance. Minimal improvements in Left knee. Pt continues to experience persistent pain, stiffness and soreness in L > R knees associated with OA. He is still responding to injections so TKR has not been discussed yet. Plan is to continue with therapy for now. Encouraged increased compliance with HEP to maximize outcomes.       PLAN:  OP PT PLAN:  Treatment/Interventions: Cryotherapy , Dry Needling  , Education/Instruction , Electrical Stimulation , Manual Therapy  , Neuromuscular re-education , Self care/home management , Taping techniques , Therapeutic activities , Therapeutic exercise  , and Ultrasound   PT Plan: Skilled PT   PT Frequency: 1-2 times per week  Duration:10  Certification Period Start Date: 25  Certification Period End Date: 25  Visits Approved: [place]  Rehab Potential: Good  Plan of Care Agreement: Patient         Goals:   Bilateral knee AROM will improve to < 5 deg ext to >120 deg flex to improve joint mobility and reduce joint stiffness and soreness; PROGRESSING  2.   Pt will increase walking tolerance from 5 min to > 10 minutes to improve functional independence; PROGRESSING  3.   Pt will report an improvement in stiffness and soreness by 50% to allow " for improved pain tolerance to all ADL's and functional mobility; PROGRESSING  4.   Pt will be independent with HEP to allow for exercise progression and eventual discharge to HEP; PROGRESSING

## 2025-06-09 ENCOUNTER — TREATMENT (OUTPATIENT)
Dept: PHYSICAL THERAPY | Facility: CLINIC | Age: 80
End: 2025-06-09
Payer: MEDICARE

## 2025-06-09 DIAGNOSIS — M17.0 ARTHRITIS OF BOTH KNEES: ICD-10-CM

## 2025-06-09 PROCEDURE — 97140 MANUAL THERAPY 1/> REGIONS: CPT | Mod: GP

## 2025-06-09 PROCEDURE — 97116 GAIT TRAINING THERAPY: CPT | Mod: GP

## 2025-06-09 NOTE — PROGRESS NOTES
"  Physical Therapy    Physical Therapy Evaluation and Treatment    Patient Name: Dionisio Smith  MRN: [id number]  Today's Date: 2025  Time Calculation  Start Time: 1125  Stop Time: 1205  Time Calculation (min): 40 min    Insurance:  Visit number: 7  Authorization info: [place] AUTH, [place]  Insurance Type: Payor: MEDICARE / Plan: MEDICARE PART A AND B / Product Type: *No Product type* /     Current Problem  1. Arthritis of both knees  Follow Up In Physical Therapy            General  79 yoM presenting to therapy for bilateral knee pain associated with OA  \"Bill\"     Precautions  LE neuropathy    SUBJECTIVE  He fell last week while walking the dog. The dog pulled him and his Left knee locked up causing him to fall. His knee was swollen, but the swelling is improving. He is having trouble going up/down the stairs now. Needs to bring both feet to one step.    Imaging:   Bilateral Knee Xray 2025  IMPRESSION:  Tricompartmental bilateral knee osteoarthrosis, severe medially.  Moderate bilateral suprapatellar joint effusions.     Pain:   Current: 0/10 no pain, mostly stiffness and soreness    Patient/Family Goal: less pain    Outcome Measures:     OBJECTIVE:    Updated 35  Observation: bilateral genu varus L > R    Transfers: Sit to stand x 1 rep without UE but voices difficulty and stiffness    Bilateral Hip AROM:  Flex: WFL B  ER at 90: WFL B  IR at 90: 50% impaired B  Ext: unable to achieve neutral B    Bilateral Knee AROM:  Ext: Lacking 8-10 deg B  Flex: 120 deg R, 115 deg L  Patellofemoral Joint Mobility: hypomobile all planes B    Gait: antalgic without device, WBOS, increased movement in frontal plane \"waddling gait\", decreased single leg stance    Stairs: able to negotiate 4\" step with 2 rails     4 Stage Balance Test:  - NBOS: 10 sec  - Modified Tandem: 10 sec R/L  - Tandem: 10 sec R, 5 sec L  - Single Le sec R/L    OP Education:   Okay to ride recumbent bike   Ice and elevation to manage swelling   Heat " "for stiffness (if not swelling)     HEP:  Access Code: [email or web address]  URL: [email or web address]  Date: 02/24/2025  Prepared by: Clarice Jose    [email or web address] Yolanda's Access code (3/6/25)    Ther EX: NOT TODAY  Rec Bike x 5 min Level 1  Step ups, 4\" x 5 R/L   Calf stretch slantboard 10\" x 5   Mini squats -attempted, painful  TKE x 10 R/L   Sidestep x 10  SAQ x 10 R/L      Manual 35  Patellar mobilizations, all planes   STM knees and quads  Massage gun to knees and quads  PROM B knees (not today)    Gait Training 5  Education on stair negotiation with step to gait pattern and rail assist    Response to treatment: Felt better    ASSESSMENT:   Pt experienced an accidental fall while walking his dog last week. His symptoms are improving, but did we review how to safely negotiate stairs with step to gait pattern. Continued with manual therapy to reduce pain and improve mobility.     PLAN:  OP PT PLAN:  Treatment/Interventions: Cryotherapy , Dry Needling  , Education/Instruction , Electrical Stimulation , Manual Therapy  , Neuromuscular re-education , Self care/home management , Taping techniques , Therapeutic activities , Therapeutic exercise  , and Ultrasound   PT Plan: Skilled PT   PT Frequency: 1-2 times per week  Duration:10  Certification Period Start Date: 5/23/25  Certification Period End Date: 8/21/25  Visits Approved: [place]  Rehab Potential: Good  Plan of Care Agreement: Patient         Goals:   Bilateral knee AROM will improve to < 5 deg ext to >120 deg flex to improve joint mobility and reduce joint stiffness and soreness; PROGRESSING  2.   Pt will increase walking tolerance from 5 min to > 10 minutes to improve functional independence; PROGRESSING  3.   Pt will report an improvement in stiffness and soreness by 50% to allow for improved pain tolerance to all ADL's and functional mobility; PROGRESSING  4.   Pt will be independent with HEP to allow for exercise progression and eventual discharge to [facility]; " PROGRESSING

## 2025-06-16 ENCOUNTER — TREATMENT (OUTPATIENT)
Dept: PHYSICAL THERAPY | Facility: CLINIC | Age: 80
End: 2025-06-16
Payer: MEDICARE

## 2025-06-16 DIAGNOSIS — M17.0 ARTHRITIS OF BOTH KNEES: ICD-10-CM

## 2025-06-16 PROCEDURE — 97110 THERAPEUTIC EXERCISES: CPT | Mod: GP

## 2025-06-16 PROCEDURE — 97140 MANUAL THERAPY 1/> REGIONS: CPT | Mod: GP

## 2025-06-16 NOTE — PROGRESS NOTES
"  Physical Therapy    Physical Therapy Evaluation and Treatment    Patient Name: Dionisio Smith  MRN: [id number]  Today's Date: 2025  Time Calculation  Start Time: 1115  Stop Time: 1200  Time Calculation (min): 45 min    Insurance:  Visit number: 7  Authorization info: [place] AUTH, [place]  Insurance Type: Payor: MEDICARE / Plan: MEDICARE PART A AND B / Product Type: *No Product type* /     Current Problem  1. Arthritis of both knees  Follow Up In Physical Therapy            General  79 yoM presenting to therapy for bilateral knee pain associated with OA  \"Bill\"     Precautions  LE neuropathy    SUBJECTIVE  Doing better. Good days, bad days. Arrives using cane today which he uses occasionally. Still the most difficulty walking more than a few minutes and doing stairs    Imaging:   Bilateral Knee Xray 2025  IMPRESSION:  Tricompartmental bilateral knee osteoarthrosis, severe medially.  Moderate bilateral suprapatellar joint effusions.     Pain:   Current: 0/10 no pain, mostly stiffness and soreness    Patient/Family Goal: less pain    Outcome Measures:     OBJECTIVE:    Updated 35  Observation: bilateral genu varus L > R    Transfers: Sit to stand x 1 rep without UE but voices difficulty and stiffness    Bilateral Hip AROM:  Flex: WFL B  ER at 90: WFL B  IR at 90: 50% impaired B  Ext: unable to achieve neutral B    Bilateral Knee AROM:  Ext: Lacking 8-10 deg B  Flex: 120 deg R, 115 deg L  Patellofemoral Joint Mobility: hypomobile all planes B    Gait: antalgic without device, WBOS, increased movement in frontal plane \"waddling gait\", decreased single leg stance    Stairs: able to negotiate 4\" step with 2 rails     4 Stage Balance Test:  - NBOS: 10 sec  - Modified Tandem: 10 sec R/L  - Tandem: 10 sec R, 5 sec L  - Single Le sec R/L    OP Education:   Okay to ride recumbent bike   Ice and elevation to manage swelling   Heat for stiffness (if not swelling)     HEP:  Access Code: [email or web address]  URL: " "[email or web address]  Date: 02/24/2025  Prepared by: Clarice Jose    [email or web address] Yolanda's Access code (3/6/25)    Ther EX: 10  Rec Bike x 5 min Level 1  Step ups, 4\" x 10 R/L   Calf stretch slantboard 10\" x 10    Manual 35  Seated inferior distraction of R/L knee  Patellar mobilizations, all planes   STM knees and quads  Massage gun to knees and quads    Response to treatment: Felt better    ASSESSMENT:   Added seated tibiofemoral distraction to program which provided some relief.     PLAN:  OP PT PLAN:  Treatment/Interventions: Cryotherapy , Dry Needling  , Education/Instruction , Electrical Stimulation , Manual Therapy  , Neuromuscular re-education , Self care/home management , Taping techniques , Therapeutic activities , Therapeutic exercise  , and Ultrasound   PT Plan: Skilled PT   PT Frequency: 1-2 times per week  Duration:10  Certification Period Start Date: 5/23/25  Certification Period End Date: 8/21/25  Visits Approved: [place]  Rehab Potential: Good  Plan of Care Agreement: Patient         Goals:   Bilateral knee AROM will improve to < 5 deg ext to >120 deg flex to improve joint mobility and reduce joint stiffness and soreness; PROGRESSING  2.   Pt will increase walking tolerance from 5 min to > 10 minutes to improve functional independence; PROGRESSING  3.   Pt will report an improvement in stiffness and soreness by 50% to allow for improved pain tolerance to all ADL's and functional mobility; PROGRESSING  4.   Pt will be independent with HEP to allow for exercise progression and eventual discharge to [facility]; PROGRESSING    "

## 2025-06-23 ENCOUNTER — APPOINTMENT (OUTPATIENT)
Dept: PHYSICAL THERAPY | Facility: CLINIC | Age: 80
End: 2025-06-23
Payer: MEDICARE

## 2025-06-24 ENCOUNTER — TREATMENT (OUTPATIENT)
Dept: PHYSICAL THERAPY | Facility: CLINIC | Age: 80
End: 2025-06-24
Payer: MEDICARE

## 2025-06-24 DIAGNOSIS — M17.0 ARTHRITIS OF BOTH KNEES: ICD-10-CM

## 2025-06-24 PROCEDURE — 97140 MANUAL THERAPY 1/> REGIONS: CPT | Mod: GP

## 2025-06-24 PROCEDURE — 97110 THERAPEUTIC EXERCISES: CPT | Mod: GP

## 2025-06-24 NOTE — PROGRESS NOTES
"  Physical Therapy    Physical Therapy Evaluation and Treatment    Patient Name: Dionisio Smith  MRN: [id number]  Today's Date: 2025  Time Calculation  Start Time: 1315  Stop Time: 1355  Time Calculation (min): 40 min    Insurance:  Visit number: 8  Authorization info: [place] AUTH, [place]  Insurance Type: Payor: MEDICARE / Plan: MEDICARE PART A AND B / Product Type: *No Product type* /     Current Problem  1. Arthritis of both knees  Follow Up In Physical Therapy            General  79 yoM presenting to therapy for bilateral knee pain associated with OA  \"Bill\"     Precautions  LE neuropathy    SUBJECTIVE  Notices his knees buckle on him if pivots or turns too quickly.     Imaging:   Bilateral Knee Xray 2025  IMPRESSION:  Tricompartmental bilateral knee osteoarthrosis, severe medially.  Moderate bilateral suprapatellar joint effusions.     Pain:   Current: 0/10 no pain, mostly stiffness and soreness    Patient/Family Goal: less pain    Outcome Measures:     OBJECTIVE:    Updated 35  Observation: bilateral genu varus L > R    Transfers: Sit to stand x 1 rep without UE but voices difficulty and stiffness    Bilateral Hip AROM:  Flex: WFL B  ER at 90: WFL B  IR at 90: 50% impaired B  Ext: unable to achieve neutral B    Bilateral Knee AROM:  Ext: Lacking 8-10 deg B  Flex: 120 deg R, 115 deg L  Patellofemoral Joint Mobility: hypomobile all planes B    Gait: antalgic without device, WBOS, increased movement in frontal plane \"waddling gait\", decreased single leg stance    Stairs: able to negotiate 4\" step with 2 rails     4 Stage Balance Test:  - NBOS: 10 sec  - Modified Tandem: 10 sec R/L  - Tandem: 10 sec R, 5 sec L  - Single Le sec R/L    OP Education:   Okay to ride recumbent bike   Ice and elevation to manage swelling   Heat for stiffness (if not swelling)     HEP:  Access Code: [email or web address]  URL: [email or web address]  Date: 2025  Prepared by: Clarice Jose    [email or web address] Yolanda's Access code " (3/6/25)    Ther EX: 5  Rec Bike x 5 min Level 1    Manual 35  Patellar mobilizations, all planes   STM knees and quads  Massage gun to knees and quads    Response to treatment: Felt better    ASSESSMENT:   Manual efforts to reduce pain and improve patellar mobility. Bike for ROM.      PLAN:  OP PT PLAN:  Treatment/Interventions: Cryotherapy , Dry Needling  , Education/Instruction , Electrical Stimulation , Manual Therapy  , Neuromuscular re-education , Self care/home management , Taping techniques , Therapeutic activities , Therapeutic exercise  , and Ultrasound   PT Plan: Skilled PT   PT Frequency: 1-2 times per week  Duration:10  Certification Period Start Date: 5/23/25  Certification Period End Date: 8/21/25  Visits Approved: [place]  Rehab Potential: Good  Plan of Care Agreement: Patient         Goals:   Bilateral knee AROM will improve to < 5 deg ext to >120 deg flex to improve joint mobility and reduce joint stiffness and soreness; PROGRESSING  2.   Pt will increase walking tolerance from 5 min to > 10 minutes to improve functional independence; PROGRESSING  3.   Pt will report an improvement in stiffness and soreness by 50% to allow for improved pain tolerance to all ADL's and functional mobility; PROGRESSING  4.   Pt will be independent with HEP to allow for exercise progression and eventual discharge to [facility]; PROGRESSING

## 2025-08-03 NOTE — PROGRESS NOTES
Mercedes Ellis MD   Adult Reconstruction and Joint Replacement Surgery  Phone: [phone]     Fax: [phone]       Name: Dionisio Smith  Age: 79 y.o.   : 1945   Date of Visit: 2025    Follow-up Knee    CC: Follow-up {LEFT RIGHT BILATERAL:60973} knee     History of Present Illness:  This patient presents with {Numbers; 1-10:19913} {weeks, months, years:36225} of {LEFT RIGHT BILATERAL:51649} knee pain.     They were last seen for this problem on ***. At the last visit, the patient ***. The patient reports ***.    Patient has tried the following {treatments tried:65992}. Date of last steroid injection: ***. Patient {DOES/DOES NOT:45132} have pain at night. The patient {DOES/DOES NOT:60739} report falls related to this problem. Patient is able to walk {Blocks: 73544}. Patient is currently using {assistive device:03065} as assistive device. Primarily complains of {pain location:40514} pain. Patient has difficulty with {activity limitations:59480}. The pain is significantly impacting their ability to perform activities of daily living. Patient reports no longer able to do activities such as ***.     Focused History  *Directly transcribed from patient self-reported intake sheet and [facility] Medical Records.      PMH: {FOCUSEDHX:38083}  PSH: {FOCUSEDSHX:37974}  SHx: {SOCHX:50273}  Jehovah´s Witness: no***  Meds: {MEDSHX:55306}  Allergies: {MEDSHX:29249}  Dental Hx: {DENTAL:84548}  FH: ***No family history of any bleeding or clotting disorders.    HISTORY  ***    Review of Systems: Review of systems completed with medical assistant intake. Please refer to this note.     Physical Exam:  BMI: ***.    General: The patient is well appearing and has an appropriate affect.     Neurological Examination: ***SILT in SPN/DPN/Sural/Saphenous/Tibial nerves. ***5/5 EHL, FHL, Tibial anterior, Gastrocnemius. ***Coordination grossly intact.     Cardiovascular Exam: ***Capillary refill <2 seconds. ***2+ DP. ***No edema.  "***No varicose veins.     Lymphatic Examination: There is no obvious lymphatic swelling*** present around the involved joint.    Skin Exam: Skin around the pertinent joint is without evidence of infection or ***rash.    Gait: patient ambulates with ***antalgic gait.    Right Hip Examination:  Examination of the hip reveals the skin to be ***intact.     There is no*** tenderness over the greater trochanter.    Range of motion is full extension to 100*** degrees of flexion.    The hip is stable without subluxation or dislocation.    The hip internally rotates to 15*** degrees and externally rotates to 45*** degrees.    There is no*** pain with hip motion.    Left Hip Examination:  Examination of the hip reveals the skin to be ***intact.     There is no*** tenderness over the greater trochanter.    Range of motion is full extension to 100*** degrees of flexion.    The hip is stable without subluxation or dislocation.    The hip internally rotates to 15*** degrees and externally rotates to 45*** degrees.    There is no*** pain with hip motion.    Left Knee Examination:  Examination of the knee reveals the skin to be intact.    There is {:53541::\"no\",\"a small\",\"a large\",\"a moderate\"} effusion in the knee.    The alignment of the knee is {:91056::\"Valgus\",\"neutral\",\"Varus\"}.    This deformity {:41039::\"is not\",\"is\"} correctable.    There is ***tenderness to palpation over the joint line.    There is ***significant quadriceps atrophy.    Range of Motion: {rom3:88829:: \"20\",\"15\",\"10\",\"5\",\"full extension\"} to {rom4:88548:: \"less than 90\",\"90\",\"100\",\"110\",\"120\"} degrees of flexion.    The knee is stable to varus-valgus stress and anterior-posterior stress.     There is {no/mild/moderate/severe:05901} grinding with range of motion.    There is {no/mild/moderate/severe:19664} patellofemoral crepitus.    Right Knee Examination:  Examination of the knee reveals the skin to be intact.    There is {:69458::\"no\",\"a small\",\"a " "large\",\"a moderate\"} effusion in the knee.    The alignment of the knee is {:53981::\"Valgus\",\"neutral\",\"Varus\"}.    This deformity {:95557::\"is not\",\"is\"} correctable.    There is ***tenderness to palpation over the joint line.    There is ***significant quadriceps atrophy.    Range of Motion: {rom3:49407:: \"20\",\"15\",\"10\",\"5\",\"full extension\"} to {rom4:23325:: \"less than 90\",\"90\",\"100\",\"110\",\"120\"} degrees of flexion.    The knee is stable to varus-valgus stress and anterior-posterior stress.     There is {no/mild/moderate/severe:19664} grinding with range of motion.    There is {no/mild/moderate/severe:19664} patellofemoral crepitus.    Imaging:  ***X-rays were personally reviewed today and show implants in good position with no evidence of complication.    ***Radiographs were personally reviewed today. There is evidence of {mild, moderate, severe: 75581} {LEFT RIGHT BILATERAL: 97049} knee osteoarthritis {with or without:91945} {medial, lateral, patellofemoral, tri:44829} bone on bone apposition.    Impression and Plan:  This patient is here for follow-up evaluation of {LEFT RIGHT BILATERAL:13816} knee.    DIAGNOSIS  ***    ***I have discussed the options in detail with the patient. We have discussed anti-inflammatory medication, activity modification, physical therapy, corticosteroid injections, viscosupplementation injections, ***partial knee replacement surgery and total knee replacement surgery. ***Patient is responding well to nonsurgical treatment measures.    ***The risks and benefits of all these treatment options have been discussed in detail.     ***The patient has tried at least 3 months of the above conservative treatments and continues to have disabling pain, impaired activities of daily living and worsened quality of life.  Reviewed the surgical optimization steps to optimize their chances for a successful joint replacement surgery.      ***Currently their BMI is ***.  Discussed that obesity is a risk " factor for continued progression of osteoarthritis. Each pound of weight loss offloads their hip and knee joints by 3-6 pounds.  The most effective of these options is weight loss mainly through restricting caloric intake. ***They would need to lose significant weight before being scheduled for surgery. A referral to a nutritionist was offered***. A referral to bariatric surgery was offered***.     ***A physical therapy prescription was ordered for the patient.  ***Patient will continue their home exercise program. ***Strategies for pain management using over-the-counter anti-inflammatory medications reviewed.  ***The patient was prescribed ***for pain management. ***The patient elects for a steroid injection, which was provided according to procedure note below. Discussed utility of brace. {Brace:20913}. Encouraged them to maintain range of motion and strength around the knee joints.  They will continue to implement these strategies in addressing their pain.      ***Patient was advised to seek further refills for prescription anti-inflammatory medications (e.g. Celebrex, Meloxicam) from their primary care physician as careful monitoring of kidney function, heart function, blood pressure, and other health considerations is important while on these medications.      ***Recommend the patient continue optimizing nonsurgical treatment interventions as outlined above for management of their knee arthritis.  ***I would be happy to see them again at any point to discuss surgery if indicated or they are more optimized or to review progress with nonsurgical treatment of arthritis. The patient verbalizes understanding with the recommendations and treatment plan as outlined above and is in agreement.  Questions were addressed.    RTC: {RTC:12797}    X-rays at next visit: {X-rays:87170}    ***Large Joint Injection 20610: Knee  Consent given by: Patient  Timeout: Immediately prior to procedure the correct patient, procedure, and  site was verified. Sterile gloves and semi-sterile technique were used.   Indications: Knee pain and joint swelling.   Location: {LEFT RIGHT BILATERAL:39693} knee  Needle size: 22 G  Approach: Lateral    Medications administered: Please refer to medical assistant note for lot number and expiration date.   Subcutaneous   4 ml of 1% lidocaine     Deep   4 ml of 1% lidocaine   4 mL 0.5% marcaine   1*** mL of 40 mg kenalog     Aspirate amount: ***  Aspirate Appearance: ***   ***Analysis: Fluid sent for laboratory analysis, including cell count, differential, culture, crystal, AFB    Patient tolerance: Dressing applied. Patient tolerated the procedure well with no immediate complications.    ***    The risks and benefits of all these treatment options have been discussed in detail. The patient has tried at least 3 months of the above conservative treatments and continues to have disabling pain, impaired activities of daily living and worsened quality of life.*** The patient is a candidate for a {LEFT RIGHT BILATERAL:40688} {PARTIAL TOTAL:73171} knee replacement. We discussed expected rehabilitation, DVT prophylaxis, time points during recovery, likely functional outcomes and long-term issues with knee replacement procedures.    We have reviewed the typical recovery after surgery and have discussed the fact that full recovery can take up to 1 year. Discussed that numbness around the incision site is common after the surgery and can last up to a year or permanently.     ***For valgus knees, specifically discussed the possibility of peroneal nerve palsy resulting from correction of valgus deformity in the setting of total knee replacement, which can manifest as numbness over the dorsum of the foot or weakness in dorsiflexion or even foot drop. These changes can sometimes be permanent.      ***The patient has pre-existing hardware around the joint. Discussed that some or all of these implants may need to be removed to  accommodate a joint replacement.    We have reviewed the typical recovery after surgery and have discussed the fact that full recovery can take up to 1 year or even longer.     The patient does not have any of the following contraindications to arthroplasty including active infection of the knee joint, systemic bacteremia, active skin infection or an open wound at the surgical site, neuropathic arthritis or severe, rapidly progressive neurological disease.     Patient will consider proceeding with {LEFT RIGHT BILATERAL:96525} {PARTIAL TOTAL:83985} knee replacement. All questions were answered today. If the patient chooses to move forward with surgical scheduling, they will be enrolled in a preoperative arthroplasty teaching class and the pre-admission testing pathway. The patient was encouraged to contact their primary care physician*** to discuss fitness for surgery.     Social support after surgery: {Social:51932}  Pain medication plan: {Pain Plan:20348}  Additional preoperative considerations: {Preop:62399}    Diagnosis: {Surg DX:44239}   Procedure: {Surg Procedure:97941}  Surgery Location: {Surg Location:57878}   Equipment Requests: {Equipment Requests Primary:74439}  Anesthesia: {Anes:38343}  Discharge: {DISCHARGE:11828}    X-rays at next visit: {X-rays:03241}    _____________________  Mercedes Ellis MD   Attending Orthopaedic Surgeon  [facility]    [facility]    This office note was transcribed with dictation software.  Please excuse any typographical errors, program misunderstandings leading to inadvertent insertions or deletions of inappropriate wording, pronoun errors and other unintentional transcription errors not noticed on proof-reading.

## 2025-08-06 ENCOUNTER — APPOINTMENT (OUTPATIENT)
Dept: ORTHOPEDIC SURGERY | Facility: HOSPITAL | Age: 80
End: 2025-08-06
Payer: MEDICARE

## 2025-08-08 ENCOUNTER — APPOINTMENT (OUTPATIENT)
Dept: ORTHOPEDIC SURGERY | Facility: CLINIC | Age: 80
End: 2025-08-08
Payer: MEDICARE

## 2025-08-08 ENCOUNTER — OFFICE VISIT (OUTPATIENT)
Dept: ORTHOPEDIC SURGERY | Facility: CLINIC | Age: 80
End: 2025-08-08
Payer: MEDICARE

## 2025-08-08 DIAGNOSIS — M17.0 ARTHRITIS OF BOTH KNEES: Primary | ICD-10-CM

## 2025-08-08 PROCEDURE — 99213 OFFICE O/P EST LOW 20 MIN: CPT | Performed by: STUDENT IN AN ORGANIZED HEALTH CARE EDUCATION/TRAINING PROGRAM

## 2025-08-08 PROCEDURE — G2211 COMPLEX E/M VISIT ADD ON: HCPCS | Performed by: STUDENT IN AN ORGANIZED HEALTH CARE EDUCATION/TRAINING PROGRAM

## 2025-08-08 PROCEDURE — 1159F MED LIST DOCD IN RCRD: CPT | Performed by: STUDENT IN AN ORGANIZED HEALTH CARE EDUCATION/TRAINING PROGRAM

## 2025-08-08 ASSESSMENT — PAIN SCALES - GENERAL: PAINLEVEL_OUTOF10: 5 - MODERATE PAIN

## 2025-08-08 ASSESSMENT — PAIN DESCRIPTION - DESCRIPTORS: DESCRIPTORS: ACHING;TIGHTNESS

## 2025-08-08 ASSESSMENT — PAIN - FUNCTIONAL ASSESSMENT: PAIN_FUNCTIONAL_ASSESSMENT: 0-10
